# Patient Record
(demographics unavailable — no encounter records)

---

## 2025-02-25 NOTE — CONSULT LETTER
[Dear  ___] : Dear  [unfilled], [Consult Letter:] : I had the pleasure of evaluating your patient, [unfilled]. [Please see my note below.] : Please see my note below. [Consult Closing:] : Thank you very much for allowing me to participate in the care of this patient.  If you have any questions, please do not hesitate to contact me. [Sincerely,] : Sincerely, [FreeTextEntry2] : Dr. Katya Frias MD (PCP) [facility] [address], [place], [place] [id number] [phone] [FreeTextEntry3] : Radha Henry MD Pediatric Otolaryngology / Head and Neck Surgery [facility]  [address] [place] [address] Tel [phone] Fax [phone]  [address] [facility], [facility] [address] [place] [address]  Tel [phone] Fax [phone]

## 2025-02-25 NOTE — HISTORY OF PRESENT ILLNESS
[de-identified] : Today I had the pleasure of seeing GULSHAN IZABELKARLA for follow up of BMT History was obtained from patient, mother and chart.  BMT 7/26/23  [de-identified] : Mother reports doing well since last clinic visit.  Unsure if ear tubes are still in place. Denies otalgia, otorrhea, recent fevers or ear infections.  No parental concerns with hearing or speech. Eating/drinking without any difficulty. No throat/tonsil infections. No nasal congestion or snoring. Chronic mouth breather

## 2025-02-25 NOTE — ASSESSMENT
[FreeTextEntry1] : GULSHAN is a 4 year old girl now s/p bilateral myringotomy with tubes 7/26/23  Eustachian Tube Dysfunction - audiogram within normal limits tymp large volume 8/29/23, tubes both near extrusion, tymp 2/25/25 [facility] - expectant management, monitor PET q6months until extrusion

## 2025-02-25 NOTE — PHYSICAL EXAM
[Placement/Patency] : tympanostomy tube in place and patent [Clear/Ventilated] : middle ear clear and well ventilated [2+] : 2+ [Normal Gait and Station] : normal gait and station [Normal muscle strength, symmetry and tone of facial, head and neck musculature] : normal muscle strength, symmetry and tone of facial, head and neck musculature [Normal] : no cervical lymphadenopathy [Exposed Vessel] : left anterior vessel not exposed [Increased Work of Breathing] : no increased work of breathing with use of accessory muscles and retractions [de-identified] : very posterior [de-identified] : tube partially extruded and some effusion present

## 2025-02-25 NOTE — REVIEW OF SYSTEMS
[de-identified] : as per HPI  [de-identified] : as per HPI  [de-identified] : as per HPI  [FreeTextEntry2] : as per HPI

## 2025-06-10 NOTE — ASSESSMENT
[FreeTextEntry1] : #molluscum, right posterior leg - education, counseling, contagious, do not share towels with siblings, do not scratch - attempted to use liquid nitrogen with mother's verbal consent, pt did not tolerate. ycanth sample provided. - Ycanth. Lot: [id number]. Exp: OCT 2025. Location: Right posterior leg x10 (max 2 applicator/tx session for 12 weeks). Verbal consent obtained from mother. SED including pain, blistering, pigmentary changes. Avoid face, genitals and perianal area. Thin film applied to only to affected areas. Left to dry for 5 min. Procedure well tolerated. Instructed not to cover with bandaid and to wash 24 hours or sooner after application with soap and warm water. Blistering is expected.    F/u 1 month, if well toleated, repeat ycanth every 3 weeks

## 2025-06-10 NOTE — PHYSICAL EXAM
[FreeTextEntry3] : Gen: well developed, well nourished, well appearing, NAD, AAOx3 Focused skin exam per patient preference: - right posterior leg with multiple umbilicated papules

## 2025-06-10 NOTE — HISTORY OF PRESENT ILLNESS
[FreeTextEntry1] : molluscum [de-identified] : 5 year F presents for molluscum on right posterior leg for several months.

## 2025-07-08 NOTE — PHYSICAL EXAM
[FreeTextEntry3] : Focused skin exam per patient preference: - right posterior leg with multiple umbilicated papules.

## 2025-07-08 NOTE — ASSESSMENT
[FreeTextEntry1] : #molluscum, right posterior leg - education, counseling, contagious, do not share towels with siblings, do not scratch - s/p ycanth 6/2025 - Cryotherapy; Location: right posterior leg x3 (only tolerated 3). Verbal informed consent obtained. Risks and benefits discussed including blister formation, hypo or hyperpigmentation and possible need for re-treatment and/or biopsy if not resolved. 1 round performed. Total freeze time 3 sec. Procedure well tolerated. Wound care reviewed. - pt unable to get ycanth at pharmacy, will [facility] - rtc for ycanth

## 2025-07-08 NOTE — HISTORY OF PRESENT ILLNESS
[FreeTextEntry1] : f/u molluscum [de-identified] : 5 year F presents for f/u molluscum. some improvement with ycanth. unable to get ycanth at pharmacy

## 2025-07-25 NOTE — ASSESSMENT
[FreeTextEntry1] : #molluscum, right posterior leg - education, counseling, contagious, do not share towels with siblings, do not scratch - s/p ycanth 6/2025 - s/p cryo 7/2025 as ycanth prescription not mailed to office. - attempted to apply ycanth however pt not agreeable today   rtc prn if amenable to ycanth

## 2025-07-25 NOTE — HISTORY OF PRESENT ILLNESS
[FreeTextEntry1] : f/u molluscum [de-identified] : 5 year F presents for f/u molluscum s/p ycanth and cryo. still with spots. here for ycanth.